# Patient Record
Sex: FEMALE | Race: OTHER | HISPANIC OR LATINO | URBAN - NONMETROPOLITAN AREA
[De-identification: names, ages, dates, MRNs, and addresses within clinical notes are randomized per-mention and may not be internally consistent; named-entity substitution may affect disease eponyms.]

---

## 2022-03-16 ENCOUNTER — APPOINTMENT (RX ONLY)
Dept: URBAN - NONMETROPOLITAN AREA CLINIC 1 | Facility: CLINIC | Age: 37
Setting detail: DERMATOLOGY
End: 2022-03-16

## 2022-03-16 DIAGNOSIS — D485 NEOPLASM OF UNCERTAIN BEHAVIOR OF SKIN: ICD-10-CM

## 2022-03-16 DIAGNOSIS — L42 PITYRIASIS ROSEA: ICD-10-CM | Status: INADEQUATELY CONTROLLED

## 2022-03-16 PROBLEM — D48.5 NEOPLASM OF UNCERTAIN BEHAVIOR OF SKIN: Status: ACTIVE | Noted: 2022-03-16

## 2022-03-16 PROCEDURE — ? PHOTO-DOCUMENTATION

## 2022-03-16 PROCEDURE — ? PRESCRIPTION

## 2022-03-16 PROCEDURE — 99203 OFFICE O/P NEW LOW 30 MIN: CPT | Mod: 25

## 2022-03-16 PROCEDURE — 11311 SHAVE SKIN LESION 0.6-1.0 CM: CPT

## 2022-03-16 PROCEDURE — ? TREATMENT REGIMEN

## 2022-03-16 PROCEDURE — ? ADDITIONAL NOTES

## 2022-03-16 PROCEDURE — ? COUNSELING

## 2022-03-16 PROCEDURE — ? SHAVE REMOVAL

## 2022-03-16 PROCEDURE — ? MEDICATION COUNSELING

## 2022-03-16 RX ORDER — TRIAMCINOLONE ACETONIDE 1 MG/G
OINTMENT TOPICAL
Qty: 454 | Refills: 0 | Status: ERX | COMMUNITY
Start: 2022-03-16

## 2022-03-16 RX ADMIN — TRIAMCINOLONE ACETONIDE: 1 OINTMENT TOPICAL at 00:00

## 2022-03-16 ASSESSMENT — LOCATION SIMPLE DESCRIPTION DERM
LOCATION SIMPLE: UPPER BACK
LOCATION SIMPLE: RIGHT FOREHEAD

## 2022-03-16 ASSESSMENT — LOCATION DETAILED DESCRIPTION DERM
LOCATION DETAILED: INFERIOR THORACIC SPINE
LOCATION DETAILED: RIGHT SUPERIOR FOREHEAD

## 2022-03-16 ASSESSMENT — LOCATION ZONE DERM
LOCATION ZONE: TRUNK
LOCATION ZONE: FACE

## 2022-03-16 NOTE — PROCEDURE: TREATMENT REGIMEN
Detail Level: Zone
Initiate Treatment: triamcinolone acetonide 0.1 % topical ointment \\nApply to aa bid for 2 weeks then twice a week as needed for itch

## 2022-03-16 NOTE — PROCEDURE: MEDICATION COUNSELING
WDL Solaraze Counseling:  I discussed with the patient the risks of Solaraze including but not limited to erythema, scaling, itching, weeping, crusting, and pain.

## 2022-03-16 NOTE — PROCEDURE: MEDICATION COUNSELING
Spontaneous, unlabored and symmetrical Zyclara Counseling:  I discussed with the patient the risks of imiquimod including but not limited to erythema, scaling, itching, weeping, crusting, and pain.  Patient understands that the inflammatory response to imiquimod is variable from person to person and was educated regarded proper titration schedule.  If flu-like symptoms develop, patient knows to discontinue the medication and contact us.

## 2022-03-16 NOTE — PROCEDURE: SHAVE REMOVAL
Medical Necessity Information: It is in your best interest to select a reason for this procedure from the list below. All of these items fulfill various CMS LCD requirements except the new and changing color options.
Medical Necessity Clause: This procedure was medically necessary because the lesion that was treated was:
Lab: -[id number]
Lab Facility: 3
Detail Level: Detailed
Was A Bandage Applied: Yes
Size Of Lesion In Cm (Required): 0.6
X Size Of Lesion In Cm (Optional): 0
Biopsy Method: Dermablade
Anesthesia Type: 1% lidocaine with epinephrine
Hemostasis: Drysol
Wound Care: Petrolatum
Render Path Notes In Note?: No
Consent was obtained from the patient. The risks and benefits to therapy were discussed in detail. Specifically, the risks of infection, scarring, bleeding, prolonged wound healing, incomplete removal, allergy to anesthesia, nerve injury and recurrence were addressed. Prior to the procedure, the treatment site was clearly identified and confirmed by the patient. All components of Universal Protocol/PAUSE Rule completed.
Post-Care Instructions: I reviewed with the patient in detail post-care instructions. Patient is to keep the biopsy site dry overnight, and then apply bacitracin twice daily until healed. Patient may apply hydrogen peroxide soaks to remove any crusting.
Notification Instructions: Patient will be notified of pathology results. However, patient instructed to call the office if not contacted within 2 weeks.
Billing Type: Third-Party Bill

## 2023-03-30 ENCOUNTER — OFFICE VISIT (OUTPATIENT)
Dept: ORTHOPEDIC SURGERY | Age: 38
End: 2023-03-30

## 2023-03-30 DIAGNOSIS — M25.362 PATELLAR INSTABILITY OF BOTH KNEES: ICD-10-CM

## 2023-03-30 DIAGNOSIS — M25.361 PATELLAR INSTABILITY OF BOTH KNEES: ICD-10-CM

## 2023-03-30 DIAGNOSIS — M22.2X1 PATELLOFEMORAL PAIN SYNDROME OF BOTH KNEES: Primary | ICD-10-CM

## 2023-03-30 DIAGNOSIS — M22.2X2 PATELLOFEMORAL PAIN SYNDROME OF BOTH KNEES: Primary | ICD-10-CM

## 2023-03-30 DIAGNOSIS — M25.562 CHRONIC PAIN OF BOTH KNEES: ICD-10-CM

## 2023-03-30 DIAGNOSIS — G89.29 CHRONIC PAIN OF BOTH KNEES: ICD-10-CM

## 2023-03-30 DIAGNOSIS — M25.561 CHRONIC PAIN OF BOTH KNEES: ICD-10-CM

## 2023-03-30 NOTE — PROGRESS NOTES
Name: Elvis Shoemaker  YOB: 1985  Gender: female  MRN: [id number]  Date of Encounter:  3/30/2023       CHIEF COMPLAINT:     Chief Complaint   Patient presents with    Knee Pain     B/L knees        SUBJECTIVE/OBJECTIVE:      HPI:    Patient is a 43 y.o. pleasant female who presents today for a new evaluation of her bilateral knee pain. She reports several years of bilateral knee pain, generally anteriorly about the knee, sometimes lateral to the patella. She has noticed that the knee pain significantly increased in 2019 after she had a child. She generally has aching that is typically worse during or after activity. She notes a lot of pain with activities like front squat, but it improves with back squat. She has problems running and would like to run. She has stopped working out as much because of his knee pain. She does not note any swelling, buckling, locking. She has a history of a right patella dislocation years ago. She has not had any injections or physical therapy. She takes medication only on occasion. PAST HISTORY:   Past medical, surgical, family, social history and allergies reviewed by me. Pertinent history:   Tobacco use:  has no history on file for tobacco use. Diabetes: none  Anticoagulation: no      REVIEW OF SYSTEMS:   As noted in HPI. PHYSICAL EXAMINATION:     Gen: Well-developed, no acute distress   HEENT: NC/AT, EOMI   Neck: Trachea midline, normal ROM   CV: Regular rhythm by palpation of distal pulse, normal capillary refill   Pulm: No respiratory distress, no stridor   Psychiatric: Well oriented, normal mood and affect. Skin: No rashes, lesions or ulcers, normal temperature, turgor, and texture on uninvolved extremity. ORTHO EXAM:    Bilateral Knee Exam:     No erythema, ecchymosis, edema to either knee. No effusion. Right knee positive J sign. Left knee negative J sign. Mild increased patellar motion to the right knee.   Normal patellar

## 2023-03-30 NOTE — PATIENT INSTRUCTIONS
pressure, high cholesterol, diabetes, or if you smoke;  a heart attack, stroke, or blood clot;  stomach ulcers, bleeding in your stomach or intestines;  asthma;  liver or kidney disease; or  fluid retention. Diclofenac can affect ovulation and it may be harder to get pregnant while you are using this medicine. If you are pregnant, you should not take diclofenac topical unless your doctor tells you to. Taking an NSAID during the last 20 weeks of pregnancy can cause serious heart or kidney problems in the unborn baby and possible complications with your pregnancy. It may not be safe to breastfeed while using this medicine. Ask your doctor about any risk. How should I use diclofenac topical?  Follow all directions on your prescription label and read all medication guides. Use the lowest dose that is effective in treating your condition. Do not take by mouth. Topical medicine is for use only on the skin. Rinse with water if this medicine gets in your eyes or mouth. Read and carefully follow any Instructions for Use provided with your medicine. Ask your doctor or pharmacist if you do not understand these instructions. Do not apply diclofenac topical to an open skin wound, or on areas of infection, rash, burn, or peeling skin. Store at room temperature away from moisture and heat. Do not freeze. What happens if I miss a dose? Apply the medicine as soon as you can, but skip the missed dose if it is almost time for your next dose. Do not apply two doses at one time. What happens if I overdose? Seek emergency medical attention or call the Poison Help line at 1-902.592.6509. What should I avoid while using diclofenac topical?  Ask a doctor or pharmacist before using other medicines for pain, fever, swelling, or cold/flu symptoms. They may contain ingredients similar to diclofenac (such as aspirin, ibuprofen, ketoprofen, or naproxen). Avoid drinking alcohol. It may increase your risk of stomach bleeding.   Avoid

## 2023-03-30 NOTE — PROGRESS NOTES
Patient was fit for Reaction knee brace for patient's right knee. The patient is instructed the webbing of the material should lie on the dorsal side of the knee with the patella placed in the center cut out. The brace then wraps behind the knee with the straps on either side of the knee. Starting with the most distal straps, tighten both straps simultaneously to insure proper placement of the brace. The top straps are then tightened simultaneously as well. Patient read and signed documenting they understand and agree to [facility]'s current DME return policy.

## 2023-08-22 NOTE — PROCEDURE: MEDICATION COUNSELING
Please follow up with Dr. Terry in clinic for further surgical planning. Please follow up with Dr. Terry in clinic for further surgical planning.  Please take 67ml of 125mg/5ml cephalexin every 8 hours for three days for infection prophylaxis. It has been sent to the pharmacy listed on your file. Minocycline Pregnancy And Lactation Text: This medication is Pregnancy Category D and not consider safe during pregnancy. It is also excreted in breast milk.

## 2024-12-04 NOTE — PROCEDURE: MEDICATION COUNSELING
"Subjective: Pt agreeable to therapeutic plan of care.  Cognition: oriented to Person, Place, Time, and Situation    Objective:     Precautions - fall prec. BLEs buckling during room mobility.     Bed Mobility: N/A or Not attempted.   Functional Transfers: CGA   Balance: sitting=  Independent, standing - CGA, dynamic = mod A due to legs buckling X2 with room mobility.   Functional Ambulation: Mod-A    Lower Body Dressing: Mod-A  ADL Position: supported sitting and supported standing    Vitals: WNL. No s/s of orthostatic hypotension. Pt c/o dizziness & light headedness with mobility.    Pain: 0   Education: Provided education on the importance of mobility in the acute care setting, Verbal/Tactile Cues, ADL training, and Transfer Training      Assessment: Selina Vazquez presents with ADL impairments affecting function including balance, endurance / activity tolerance, and shortness of breath. Pt stating dizziness not as bad today. She was able to participate with LB ADLS with assist. During room mobility her legs buckled X2 requiring assist to regain balance, posture. Demonstrated functioning below baseline abilities indicate the need for continued skilled intervention while inpatient. Tolerating session today without incident. Will continue to follow and progress as tolerated.     Plan/Recommendations:   Moderate Intensity Therapy recommended post-acute care. This is recommended as therapy feels the patient would require 3-4 days per week and wouldn't tolerate \"3 hour daily\" rehab intensity. SNF would be the preferred choice. If the patient does not agree to SNF, arrange HH or OP depending on home bound status. If patient is medically complex, consider LTACH.. Pt requires no DME at discharge.     Pt desires Skilled Rehab placement at discharge. Pt cooperative; agreeable to therapeutic recommendations and plan of care.     Modified Rhea: N/A = No pre-op stroke/TIA    Post-Tx Position: Up in Chair, Alarms activated, " and Call light and personal items within reach  PPE: gloves       Time Calculation- OT       Row Name 12/04/24 1624             Time Calculation- OT    OT Start Time 0928  -DT      OT Stop Time 0940  -DT      OT Time Calculation (min) 12 min  -DT      Total Timed Code Minutes- OT 12 minute(s)  -DT      OT Received On 12/04/24  -DT      OT - Next Appointment 12/06/24  -DT                User Key  (r) = Recorded By, (t) = Taken By, (c) = Cosigned By      Initials Name Provider Type    Anjelica Gill, OT Occupational Therapist                    Mirvaso Counseling: Mirvaso is a topical medication which can decrease superficial blood flow where applied. Side effects are uncommon and include stinging, redness and allergic reactions.